# Patient Record
Sex: MALE | Race: WHITE | NOT HISPANIC OR LATINO | URBAN - METROPOLITAN AREA
[De-identification: names, ages, dates, MRNs, and addresses within clinical notes are randomized per-mention and may not be internally consistent; named-entity substitution may affect disease eponyms.]

---

## 2019-06-25 ENCOUNTER — EMERGENCY (EMERGENCY)
Facility: HOSPITAL | Age: 84
LOS: 1 days | Discharge: HOME | End: 2019-06-25
Attending: EMERGENCY MEDICINE | Admitting: EMERGENCY MEDICINE
Payer: MEDICARE

## 2019-06-25 VITALS
HEIGHT: 72 IN | HEART RATE: 73 BPM | RESPIRATION RATE: 16 BRPM | TEMPERATURE: 98 F | SYSTOLIC BLOOD PRESSURE: 145 MMHG | OXYGEN SATURATION: 97 % | DIASTOLIC BLOOD PRESSURE: 72 MMHG | WEIGHT: 179.9 LBS

## 2019-06-25 VITALS
SYSTOLIC BLOOD PRESSURE: 132 MMHG | DIASTOLIC BLOOD PRESSURE: 77 MMHG | RESPIRATION RATE: 16 BRPM | HEART RATE: 74 BPM | TEMPERATURE: 98 F | OXYGEN SATURATION: 96 %

## 2019-06-25 DIAGNOSIS — Z95.828 PRESENCE OF OTHER VASCULAR IMPLANTS AND GRAFTS: Chronic | ICD-10-CM

## 2019-06-25 DIAGNOSIS — Z95.810 PRESENCE OF AUTOMATIC (IMPLANTABLE) CARDIAC DEFIBRILLATOR: Chronic | ICD-10-CM

## 2019-06-25 PROCEDURE — 99282 EMERGENCY DEPT VISIT SF MDM: CPT | Mod: 25

## 2019-06-25 PROCEDURE — 30901 CONTROL OF NOSEBLEED: CPT | Mod: RT

## 2019-06-25 PROCEDURE — 30901 CONTROL OF NOSEBLEED: CPT

## 2019-06-25 NOTE — ED PROVIDER NOTE - OBJECTIVE STATEMENT
82yo male who presents with epistaxis tonite. pt had sudden onset of nosebleed for the last 2 hours, no pain, no dizziness, no headache, pt is on xarelto

## 2019-06-25 NOTE — ED PROVIDER NOTE - PROGRESS NOTE DETAILS
pt reevaluted, feeling better, no bleeding with packing in place. pt to be d/c home follow up with ent, return if any symtpoms worsen

## 2019-06-25 NOTE — ED ADULT NURSE NOTE - OBJECTIVE STATEMENT
84 y/o M patient presents to ED from home c/o epistaxis. Patient reports he got up out of bed and started to bleed from nose around 2300. Patient reports bleeding worsened over the next few hours. Patient A&Ox4. lungs CTA. skin warm and intact. abdomen soft, non tender, non distended. Patient denies HA, dizziness, SOB, chest pain, abdominal pain, N/V/D. Safety and comfort measures provided and maintained. MD bedside.

## 2019-06-25 NOTE — ED ADULT NURSE NOTE - CHPI ED NUR SYMPTOMS NEG
no syncope/no loss of consciousness/no fever/no numbness/no vomiting/no weakness/no chills/no nausea/no bleeding gums

## 2019-11-09 ENCOUNTER — EMERGENCY (EMERGENCY)
Facility: HOSPITAL | Age: 84
LOS: 1 days | Discharge: HOME | End: 2019-11-09
Attending: EMERGENCY MEDICINE | Admitting: EMERGENCY MEDICINE
Payer: MEDICARE

## 2019-11-09 VITALS
HEART RATE: 71 BPM | SYSTOLIC BLOOD PRESSURE: 142 MMHG | TEMPERATURE: 98 F | OXYGEN SATURATION: 96 % | HEIGHT: 72 IN | DIASTOLIC BLOOD PRESSURE: 73 MMHG | WEIGHT: 184.97 LBS | RESPIRATION RATE: 18 BRPM

## 2019-11-09 VITALS
HEART RATE: 76 BPM | TEMPERATURE: 98 F | SYSTOLIC BLOOD PRESSURE: 118 MMHG | DIASTOLIC BLOOD PRESSURE: 71 MMHG | RESPIRATION RATE: 15 BRPM | OXYGEN SATURATION: 95 %

## 2019-11-09 DIAGNOSIS — Z95.810 PRESENCE OF AUTOMATIC (IMPLANTABLE) CARDIAC DEFIBRILLATOR: Chronic | ICD-10-CM

## 2019-11-09 DIAGNOSIS — Z95.828 PRESENCE OF OTHER VASCULAR IMPLANTS AND GRAFTS: Chronic | ICD-10-CM

## 2019-11-09 PROCEDURE — 99282 EMERGENCY DEPT VISIT SF MDM: CPT | Mod: 25

## 2019-11-09 PROCEDURE — 30901 CONTROL OF NOSEBLEED: CPT

## 2019-11-09 PROCEDURE — 30901 CONTROL OF NOSEBLEED: CPT | Mod: RT

## 2019-11-09 PROCEDURE — 99283 EMERGENCY DEPT VISIT LOW MDM: CPT

## 2019-11-09 RX ORDER — SPIRONOLACTONE 25 MG/1
0 TABLET, FILM COATED ORAL
Qty: 0 | Refills: 0 | DISCHARGE

## 2019-11-09 RX ORDER — GLIMEPIRIDE 1 MG
0 TABLET ORAL
Qty: 0 | Refills: 0 | DISCHARGE

## 2019-11-09 RX ORDER — LISINOPRIL 2.5 MG/1
1 TABLET ORAL
Qty: 0 | Refills: 0 | DISCHARGE

## 2019-11-09 RX ORDER — CARVEDILOL PHOSPHATE 80 MG/1
1 CAPSULE, EXTENDED RELEASE ORAL
Qty: 0 | Refills: 0 | DISCHARGE

## 2019-11-09 RX ORDER — ATORVASTATIN CALCIUM 80 MG/1
1 TABLET, FILM COATED ORAL
Qty: 0 | Refills: 0 | DISCHARGE

## 2019-11-09 RX ORDER — APIXABAN 2.5 MG/1
0 TABLET, FILM COATED ORAL
Qty: 0 | Refills: 0 | DISCHARGE

## 2019-11-09 RX ORDER — LIRAGLUTIDE 6 MG/ML
0 INJECTION SUBCUTANEOUS
Qty: 0 | Refills: 0 | DISCHARGE

## 2019-11-09 RX ORDER — GABAPENTIN 400 MG/1
0 CAPSULE ORAL
Qty: 0 | Refills: 0 | DISCHARGE

## 2019-11-09 RX ORDER — WARFARIN SODIUM 2.5 MG/1
0 TABLET ORAL
Qty: 0 | Refills: 0 | DISCHARGE

## 2019-11-09 RX ORDER — PIOGLITAZONE HYDROCHLORIDE 15 MG/1
1 TABLET ORAL
Qty: 0 | Refills: 0 | DISCHARGE

## 2019-11-09 RX ORDER — ALLOPURINOL 300 MG
0 TABLET ORAL
Qty: 0 | Refills: 0 | DISCHARGE

## 2019-11-09 NOTE — ED PROVIDER NOTE - PATIENT PORTAL LINK FT
You can access the FollowMyHealth Patient Portal offered by [facility] by registering at the following website: [email or web address]. By joining [facility]’s FollowMyHealth portal, you will also be able to view your health information using other applications (apps) compatible with our system.

## 2019-11-09 NOTE — ED PROVIDER NOTE - CHPI ED SYMPTOMS NEG
no weakness/no numbness/no vomiting/no fever/no change in level of consciousness/no chills/no loss of consciousness/no nausea/no blurred vision

## 2019-11-09 NOTE — ED PROVIDER NOTE - OBJECTIVE STATEMENT
pt on eliquis c/o epistaxis tonight. no trauma, fever, ha, d/n/v, cp, sob, abd pain, weakness, or any other complaints. bleeding resolved pta  ent - soltic

## 2019-11-10 PROBLEM — I48.91 UNSPECIFIED ATRIAL FIBRILLATION: Chronic | Status: ACTIVE | Noted: 2019-06-25

## 2019-11-10 PROBLEM — E11.9 TYPE 2 DIABETES MELLITUS WITHOUT COMPLICATIONS: Chronic | Status: ACTIVE | Noted: 2019-06-25

## 2021-11-29 PROBLEM — Z00.00 ENCOUNTER FOR PREVENTIVE HEALTH EXAMINATION: Status: ACTIVE | Noted: 2021-11-29

## 2021-12-10 ENCOUNTER — APPOINTMENT (OUTPATIENT)
Dept: UROLOGY | Facility: CLINIC | Age: 86
End: 2021-12-10
Payer: MEDICARE

## 2021-12-10 VITALS
RESPIRATION RATE: 17 BRPM | HEIGHT: 72 IN | TEMPERATURE: 98 F | SYSTOLIC BLOOD PRESSURE: 103 MMHG | WEIGHT: 180 LBS | HEART RATE: 97 BPM | BODY MASS INDEX: 24.38 KG/M2 | DIASTOLIC BLOOD PRESSURE: 63 MMHG

## 2021-12-10 DIAGNOSIS — R39.15 URGENCY OF URINATION: ICD-10-CM

## 2021-12-10 DIAGNOSIS — R35.0 FREQUENCY OF MICTURITION: ICD-10-CM

## 2021-12-10 PROCEDURE — 99205 OFFICE O/P NEW HI 60 MIN: CPT

## 2021-12-10 RX ORDER — ALLOPURINOL 200 MG/1
TABLET ORAL
Refills: 0 | Status: ACTIVE | COMMUNITY

## 2021-12-10 RX ORDER — PANTOPRAZOLE SODIUM 40 MG/10ML
INJECTION, POWDER, FOR SOLUTION INTRAVENOUS
Refills: 0 | Status: ACTIVE | COMMUNITY

## 2021-12-10 RX ORDER — ATORVASTATIN CALCIUM 80 MG/1
TABLET, FILM COATED ORAL
Refills: 0 | Status: ACTIVE | COMMUNITY

## 2021-12-10 NOTE — REVIEW OF SYSTEMS
[Feeling Tired] : feeling tired [Constipation] : constipation [Seen by urologist before (Name)  ___] : Preciously seen by a urologist: [unfilled] [Urine Infection (bladder/kidney)] : bladder/kidney infection [Wake up at night to urinate  How many times?  ___] : wakes up to urinate [unfilled] times during the night [Bladder pressure] : experiences bladder pressure [Strain or push to urinate] : strain or push to urinate [Leakage of urine with urgency] : leakage of urine with urgency [Leakage of urine with straining, coughing, laughing] : leakage of urine with straining, coughing, laughing [Dizziness] : dizziness [Limb Weakness] : limb weakness [Difficulty Walking] : difficulty walking [Anxiety] : anxiety [Depression] : depression [Easy Bleeding] : a tendency for easy bleeding [Easy Bruising] : a tendency for easy bruising [Negative] : Endocrine

## 2021-12-10 NOTE — PHYSICAL EXAM
[General Appearance - Well Developed] : well developed [General Appearance - Well Nourished] : well nourished [Normal Appearance] : normal appearance [Well Groomed] : well groomed [General Appearance - In No Acute Distress] : no acute distress [Edema] : no peripheral edema [Respiration, Rhythm And Depth] : normal respiratory rhythm and effort [Exaggerated Use Of Accessory Muscles For Inspiration] : no accessory muscle use [Abdomen Soft] : soft [Abdomen Tenderness] : non-tender [Urinary Bladder Findings] : the bladder was normal on palpation [Normal Station and Gait] : the gait and station were normal for the patient's age [] : no rash [No Focal Deficits] : no focal deficits [Affect] : the affect was normal [Mood] : the mood was normal [Not Anxious] : not anxious

## 2021-12-13 LAB
APPEARANCE: CLEAR
BACTERIA UR CULT: NORMAL
BACTERIA: NEGATIVE
BILIRUBIN URINE: NEGATIVE
BLOOD URINE: NEGATIVE
COLOR: YELLOW
GLUCOSE QUALITATIVE U: NEGATIVE
HYALINE CASTS: 0 /LPF
KETONES URINE: NEGATIVE
LEUKOCYTE ESTERASE URINE: ABNORMAL
MICROSCOPIC-UA: NORMAL
NITRITE URINE: NEGATIVE
PH URINE: 7
PROTEIN URINE: ABNORMAL
RED BLOOD CELLS URINE: 2 /HPF
SPECIFIC GRAVITY URINE: 1.02
SQUAMOUS EPITHELIAL CELLS: 2 /HPF
UROBILINOGEN URINE: NORMAL
WHITE BLOOD CELLS URINE: 21 /HPF

## 2021-12-13 NOTE — ASSESSMENT
[FreeTextEntry1] : Urinary Frequency and Urgency. Discussed pt with multiple risk factors after initial RT tx >20y ago and now again with tx with proton beam. Discussed that urinary sx are likely a mixed picture with possible UUI and HAN but difficult to tell based on sx alone. He has a very difficult management situation due to his prostate cancer treatments and the increased risk of any/all surgical interventions. Pt with a lot of bother from the cunningham clamp. Discussed management with condom cath which will not address pts frequency but aides in the management and ideally will decrease bother. \par - Patient's wife will reach out to Dr. Mosqueda's office to obtain copies of UDS and other testing\par - Recommended to discontinue the Cunningham clamp \par - Trial of condom catheter \par - Follow up in 3-4 months\par \par Prostate cancer s/p EBRT and Proton Beam therapy\par - Patient's wife will try to get pathology and more information about prostate cancer from prior urologists. \par --PSA in 3-4mo at follow-up

## 2021-12-13 NOTE — HISTORY OF PRESENT ILLNESS
[FreeTextEntry1] : 85yo gentleman with DM2 (on insulin), aortic valve replacement, and pacemaker (on carvedilol and eliquis) with cc of urinary leakage. He has a history of prostate cancer 30 years ago (unknown path) and was treated with external beam radiation therapy at the time. He had urinary retention in April 2020 down in [place]. He had catheter placed and saw Uro. Had prostate bx showed recurrence (unknown path). He reports he had bone scan as well that was negative. He was given proton beam tx with ADT for 18mo. Reports cath was removed after his proton beam tx. His last ADT dose was 6mo ago and he reports that last PSA with Maiman was recent and was <0.1. \par \par For the urinary incontinence, Dr. Patel referred the patient to Dr. Mosqueda. Dr. Mosqueda did a workup including cystoscopy and urodynamics (patient does not know results). They ended up deciding on an AUS. However, close to the time of surgery, Dr. Mosqueda recommended against AUS due to potential difficulty with inflating and deflating cuff. Instead, he was given a Cunningham clamp which is very uncomfortable. Dr. Patel also gave him  a trial of vibegron which did not help improve symptoms. \par \par Currently, he is complaining of significant urge followed by urinary leakage which is slightly improved with the Cunningham clamp. He also states that he has leakage at random times. He goes through 5-6 diapers per day. Complains of one episode of urination per hour during the day and waking up on average 3x a night. No recent hematuria or dysuria. \par \par The patient is also stating that he has been constipated recently and passing a few small blood clots per rectum. Saw a GI who stated that it is most likely a side effect of the proton therapy. He is having a colonoscopy in near future\par \par Drinks about 2-3 bottles of water a day and one cup of coffee daily. Denies alcohol use. \par . \par

## 2021-12-15 ENCOUNTER — NON-APPOINTMENT (OUTPATIENT)
Age: 86
End: 2021-12-15

## 2021-12-15 ENCOUNTER — APPOINTMENT (OUTPATIENT)
Dept: UROLOGY | Facility: CLINIC | Age: 86
End: 2021-12-15
Payer: MEDICARE

## 2021-12-15 PROCEDURE — 51798 US URINE CAPACITY MEASURE: CPT

## 2021-12-15 PROCEDURE — 99213 OFFICE O/P EST LOW 20 MIN: CPT

## 2021-12-17 NOTE — HISTORY OF PRESENT ILLNESS
[FreeTextEntry1] : 87 yo gentleman with DM2 (on insulin), aortic valve replacement, and pacemaker (on carvedilol and eliquis) with cc of urinary leakage. He has a history of prostate cancer 30 years ago (unknown path) and was treated with external beam radiation therapy at the time. He had urinary retention in April 2020 down in [place]. He had catheter placed and saw Uro. Had prostate bx showed recurrence (unknown path). He reports he had bone scan as well that was negative. He was given proton beam tx with ADT for 18mo. Reports cath was removed after his proton beam tx. His last ADT dose was 6mo ago and he reports that last PSA with Maiman was recent and was <0.1. \par \par For the urinary incontinence, Dr. Patel referred the patient to Dr. Mosqueda. Dr. Mosqueda did a workup including cystoscopy and urodynamics (patient does not know results). They ended up deciding on an AUS. However, close to the time of surgery, Dr. Mosqueda recommended against AUS due to potential difficulty with inflating and deflating cuff. Instead, he was given a Cunningham clamp which is very uncomfortable. Dr. Patel also gave him a trial of vibegron which did not help improve symptoms. \par Currently, he is complaining of significant urge followed by urinary leakage which is slightly improved with the Cunningham clamp. He also states that he has leakage at random times. He goes through 5-6 diapers per day. Complains of one episode of urination per hour during the day and waking up on average 3x a night. No recent hematuria or dysuria. The patient is also stating that he has been constipated recently and passing a few small blood clots per rectum. Saw a GI who stated that it is most likely a side effect of the proton therapy. He is having a colonoscopy in near future.Drinks about 2-3 bottles of water a day and one cup of coffee daily. Denies alcohol use. \par TODAY pt presents with inability to hold urine, frequency Q 1 h day time and nocturia X 2, with frequent dribbling in between voids, using Joy clamp , awaiting [place] External catheter that he ordered. Uses 5-6  pull ups per day .He denies straining, reports strong stream with some intermittency at times , and post void dribbling .Fluids : 16  oz x 2-3 and 1/2 cup of coffee. Denies constipation. \par explained to pt that external catheter does not address incomplete bladder emptying , but will improve his QOL with reduced UUI episodes.Pt comprehends same. Meds reconciled. RTO in 3 months .Obtain UDS AND MR from previous Urologist .

## 2021-12-17 NOTE — ASSESSMENT
[FreeTextEntry1] : 87 yo gentleman with DM2 (on insulin), aortic valve replacement, and pacemaker (on carvedilol and eliquis) with cc of urinary leakage. He has a history of prostate cancer 30 years ago (unknown path) and was treated with external beam radiation therapy at the time. He had urinary retention in April 2020 down in [place]. He had catheter placed and saw Uro. Had prostate bx showed recurrence (unknown path). He reports he had bone scan as well that was negative. He was given proton beam tx with ADT for 18mo. Reports cath was removed after his proton beam tx. His last ADT dose was 6mo ago and he reports that last PSA with Maiman was recent and was <0.1. \par \par Trial of [place] external male condom catheter .Refrain from using Cunningham clamp to prevent penile injury .\par RTO in 3 months .Obtain UDS AND MR from previous Urologist .

## 2022-01-10 ENCOUNTER — APPOINTMENT (OUTPATIENT)
Dept: UROLOGY | Facility: CLINIC | Age: 87
End: 2022-01-10
Payer: MEDICARE

## 2022-01-10 DIAGNOSIS — C61 MALIGNANT NEOPLASM OF PROSTATE: ICD-10-CM

## 2022-01-10 DIAGNOSIS — N39.46 MIXED INCONTINENCE: ICD-10-CM

## 2022-01-10 PROCEDURE — 99214 OFFICE O/P EST MOD 30 MIN: CPT

## 2022-01-10 PROCEDURE — 51798 US URINE CAPACITY MEASURE: CPT

## 2022-01-10 RX ORDER — SOLIFENACIN SUCCINATE 10 MG/1
10 TABLET ORAL
Qty: 90 | Refills: 0 | Status: COMPLETED | COMMUNITY
Start: 2021-08-16

## 2022-01-10 RX ORDER — APIXABAN 2.5 MG/1
2.5 TABLET, FILM COATED ORAL
Refills: 0 | Status: DISCONTINUED | COMMUNITY
End: 2022-01-10

## 2022-01-10 RX ORDER — SERTRALINE 25 MG/1
25 TABLET, FILM COATED ORAL
Qty: 30 | Refills: 0 | Status: COMPLETED | COMMUNITY
Start: 2021-07-21

## 2022-01-10 RX ORDER — FERROUS SULFATE TAB 325 MG (65 MG ELEMENTAL FE) 325 (65 FE) MG
325 (65 FE) TAB ORAL
Qty: 90 | Refills: 0 | Status: ACTIVE | COMMUNITY
Start: 2021-10-26

## 2022-01-10 RX ORDER — INSULIN ASPART 100 [IU]/ML
100 INJECTION, SOLUTION INTRAVENOUS; SUBCUTANEOUS
Qty: 45 | Refills: 0 | Status: ACTIVE | COMMUNITY
Start: 2021-10-17

## 2022-01-10 RX ORDER — CARVEDILOL 12.5 MG/1
12.5 TABLET, FILM COATED ORAL
Refills: 0 | Status: DISCONTINUED | COMMUNITY
End: 2022-01-10

## 2022-01-10 RX ORDER — INSULIN GLARGINE 100 [IU]/ML
INJECTION, SOLUTION SUBCUTANEOUS
Refills: 0 | Status: COMPLETED | COMMUNITY
End: 2022-01-10

## 2022-01-10 RX ORDER — AZITHROMYCIN 250 MG/1
250 TABLET, FILM COATED ORAL
Qty: 6 | Refills: 0 | Status: COMPLETED | COMMUNITY
Start: 2021-08-17

## 2022-01-10 RX ORDER — FUROSEMIDE 80 MG/1
TABLET ORAL
Refills: 0 | Status: ACTIVE | COMMUNITY

## 2022-01-10 RX ORDER — INSULIN ASPART 100 [IU]/ML
INJECTION, SOLUTION INTRAVENOUS; SUBCUTANEOUS
Refills: 0 | Status: DISCONTINUED | COMMUNITY
End: 2022-01-10

## 2022-01-10 RX ORDER — SERTRALINE HYDROCHLORIDE 50 MG/1
50 TABLET, FILM COATED ORAL
Qty: 90 | Refills: 0 | Status: ACTIVE | COMMUNITY
Start: 2021-11-16

## 2022-01-10 RX ORDER — ENALAPRIL MALEATE 2.5 MG/1
2.5 TABLET ORAL
Qty: 90 | Refills: 0 | Status: ACTIVE | COMMUNITY
Start: 2021-11-30

## 2022-01-10 RX ORDER — AMOXICILLIN AND CLAVULANATE POTASSIUM 875; 125 MG/1; MG/1
875-125 TABLET, COATED ORAL
Qty: 10 | Refills: 0 | Status: COMPLETED | COMMUNITY
Start: 2021-08-17

## 2022-01-10 RX ORDER — INSULIN GLARGINE 100 [IU]/ML
100 INJECTION, SOLUTION SUBCUTANEOUS
Qty: 30 | Refills: 0 | Status: ACTIVE | COMMUNITY
Start: 2021-11-17

## 2022-01-10 RX ORDER — MIRABEGRON 50 MG/1
50 TABLET, FILM COATED, EXTENDED RELEASE ORAL
Qty: 90 | Refills: 0 | Status: COMPLETED | COMMUNITY
Start: 2021-09-21 | End: 2022-01-10

## 2022-01-10 RX ORDER — APIXABAN 5 MG/1
5 TABLET, FILM COATED ORAL
Qty: 60 | Refills: 0 | Status: ACTIVE | COMMUNITY
Start: 2021-11-29

## 2022-01-10 RX ORDER — CARVEDILOL 6.25 MG/1
6.25 TABLET, FILM COATED ORAL
Qty: 180 | Refills: 0 | Status: ACTIVE | COMMUNITY
Start: 2021-10-28

## 2022-01-11 NOTE — PHYSICAL EXAM
[General Appearance - Well Developed] : well developed [General Appearance - Well Nourished] : well nourished [Normal Appearance] : normal appearance [Well Groomed] : well groomed [General Appearance - In No Acute Distress] : no acute distress [Abdomen Soft] : soft [Abdomen Tenderness] : non-tender [Costovertebral Angle Tenderness] : no ~M costovertebral angle tenderness [Urethral Meatus] : meatus normal [Urinary Bladder Findings] : the bladder was normal on palpation [Edema] : no peripheral edema [] : no respiratory distress [Respiration, Rhythm And Depth] : normal respiratory rhythm and effort [Exaggerated Use Of Accessory Muscles For Inspiration] : no accessory muscle use [Oriented To Time, Place, And Person] : oriented to person, place, and time [Affect] : the affect was normal [Mood] : the mood was normal [Not Anxious] : not anxious [Normal Station and Gait] : the gait and station were normal for the patient's age [FreeTextEntry1] : condom cath in place

## 2022-01-11 NOTE — HISTORY OF PRESENT ILLNESS
[FreeTextEntry1] : 87 yo gentleman with DM2 (on insulin), aortic valve replacement, and pacemaker (on carvedilol and eliquis) with cc of urinary leakage. He has a history of prostate cancer 30 years ago (unknown path) and was treated with external beam radiation therapy at the time. He had urinary retention in April 2020 down in [place]. He had catheter placed and saw Uro. Had prostate bx showed recurrence (unknown path). He reports he had bone scan as well that was negative. He was given proton beam tx with ADT for 18mo. Reports cath was removed after his proton beam tx. His last ADT dose was 6mo ago and he reports that last PSA with Maiman was recent and was <0.1. \par \par For the urinary incontinence, Dr. Patel referred the patient to Dr. Mosqueda. Dr. Mosqueda did a workup including cystoscopy and urodynamics (patient does not know results). They ended up deciding on an AUS. However, close to the time of surgery, Dr. Mosqueda recommended against AUS due to potential difficulty with inflating and deflating cuff. Instead, he was given a Cunningham clamp which is very uncomfortable. Dr. Patel also gave him a trial of vibegron which did not help improve symptoms. Currently, he is complaining of significant urge followed by urinary leakage which is slightly improved with the Cunningham clamp. He also states that he has leakage at random times. He goes through 5-6 diapers per day. Complains of one episode of urination per hour during the day and waking up on average 3x a night. No recent hematuria or dysuria. The patient is also stating that he has been constipated recently and passing a few small blood clots per rectum. Saw a GI who stated that it is most likely a side effect of the proton therapy. He is having a colonoscopy in near future.Drinks about 2-3 bottles of water a day and one cup of coffee daily. Denies alcohol use. Spent significant time with pt and his wife discussing difficulties with management of his urinary issues in the setting of radiation and proton beam tx as well as advanced age and comorbid conditions. Opted for management with condom cath which pt has been using with benefit. He again notes no benefit with B-agonist. \par \par Pt comes in today and again wants to discuss options for management. Above was reiterated. pt also asking about prostate ca management and potential need for ADT. Proton beam therapy in [place] by Dr Sudarshan Garcia X 21  rx , completed in April 2020. Was on ADT but has been off for >6mo. Had PSA ~3mo ago that was <0.1 per report. PVR today is 0cc. \par \par \par \par \par \par

## 2022-01-11 NOTE — ASSESSMENT
[FreeTextEntry1] : 85 yo gentleman with DM2 (on insulin), aortic valve replacement, and pacemaker (on carvedilol and eliquis) with cc of urinary leakage. He has a history of prostate cancer 30 years ago (unknown path) and was treated with external beam radiation therapy at the time and most recently with proton beam therapy and ADT after pt developed retention. Now emptying well but with bother. Managed with condom cath. Discussed that with pts age and his prostate ca history, goal of care should be prevention of symptomatic disease. Currently no bony sx and most recent PSA low. Discussed it would be expected for PSA to rise on recheck. \par --D/c Myrbetriq ER 50  mg - ineffective.\par --PSA in 3-6 months.\par --Obtain Medical records from prior urologist Dr Patel and Dr Mosqueda -including  UDS reports. \par --Continue [place] external catheter .\par --RTC in 3-6mo

## 2022-01-11 NOTE — REVIEW OF SYSTEMS
[see HPI] : see HPI [Incontinence] : incontinence [Negative] : Heme/Lymph [Constipation] : no constipation [Dysuria] : no dysuria

## 2022-03-10 ENCOUNTER — APPOINTMENT (OUTPATIENT)
Dept: UROLOGY | Facility: CLINIC | Age: 87
End: 2022-03-10

## 2022-07-11 ENCOUNTER — APPOINTMENT (OUTPATIENT)
Dept: UROLOGY | Facility: CLINIC | Age: 87
End: 2022-07-11

## 2022-09-08 ENCOUNTER — APPOINTMENT (OUTPATIENT)
Dept: UROLOGY | Facility: CLINIC | Age: 87
End: 2022-09-08